# Patient Record
Sex: FEMALE | Race: WHITE | NOT HISPANIC OR LATINO | Employment: UNEMPLOYED | URBAN - METROPOLITAN AREA
[De-identification: names, ages, dates, MRNs, and addresses within clinical notes are randomized per-mention and may not be internally consistent; named-entity substitution may affect disease eponyms.]

---

## 2024-04-04 ENCOUNTER — HOSPITAL ENCOUNTER (EMERGENCY)
Facility: HOSPITAL | Age: 34
Discharge: HOME | End: 2024-04-04
Attending: STUDENT IN AN ORGANIZED HEALTH CARE EDUCATION/TRAINING PROGRAM
Payer: MEDICAID

## 2024-04-04 VITALS
RESPIRATION RATE: 18 BRPM | DIASTOLIC BLOOD PRESSURE: 72 MMHG | HEART RATE: 82 BPM | BODY MASS INDEX: 26.54 KG/M2 | SYSTOLIC BLOOD PRESSURE: 110 MMHG | HEIGHT: 66 IN | WEIGHT: 165.13 LBS | TEMPERATURE: 98 F | OXYGEN SATURATION: 100 %

## 2024-04-04 DIAGNOSIS — N93.9 VAGINAL BLEEDING: Primary | ICD-10-CM

## 2024-04-04 LAB
ABORH RETYPE: NORMAL
ALBUMIN SERPL-MCNC: 3.7 G/DL (ref 3.5–5)
ALBUMIN/GLOB SERPL: 1 RATIO (ref 1.1–2)
ALP SERPL-CCNC: 72 UNIT/L (ref 40–150)
ALT SERPL-CCNC: 14 UNIT/L (ref 0–55)
APPEARANCE UR: ABNORMAL
AST SERPL-CCNC: 13 UNIT/L (ref 5–34)
B-HCG UR QL: NEGATIVE
BACTERIA #/AREA URNS AUTO: ABNORMAL /HPF
BASOPHILS # BLD AUTO: 0.02 X10(3)/MCL
BASOPHILS NFR BLD AUTO: 0.2 %
BILIRUB SERPL-MCNC: 0.2 MG/DL
BILIRUB UR QL STRIP.AUTO: NEGATIVE
BUN SERPL-MCNC: 8 MG/DL (ref 7–18.7)
CALCIUM SERPL-MCNC: 9.5 MG/DL (ref 8.4–10.2)
CHLORIDE SERPL-SCNC: 104 MMOL/L (ref 98–107)
CO2 SERPL-SCNC: 25 MMOL/L (ref 22–29)
COLOR UR AUTO: ABNORMAL
CREAT SERPL-MCNC: 0.74 MG/DL (ref 0.55–1.02)
CTP QC/QA: YES
EOSINOPHIL # BLD AUTO: 0.09 X10(3)/MCL (ref 0–0.9)
EOSINOPHIL NFR BLD AUTO: 1 %
ERYTHROCYTE [DISTWIDTH] IN BLOOD BY AUTOMATED COUNT: 14.3 % (ref 11.5–17)
GFR SERPLBLD CREATININE-BSD FMLA CKD-EPI: >60 MLS/MIN/1.73/M2
GLOBULIN SER-MCNC: 3.7 GM/DL (ref 2.4–3.5)
GLUCOSE SERPL-MCNC: 348 MG/DL (ref 74–100)
GLUCOSE UR QL STRIP.AUTO: ABNORMAL
GROUP & RH: NORMAL
HCT VFR BLD AUTO: 37.4 % (ref 37–47)
HGB BLD-MCNC: 11.8 G/DL (ref 12–16)
HOLD SPECIMEN: NORMAL
IMM GRANULOCYTES # BLD AUTO: 0.02 X10(3)/MCL (ref 0–0.04)
IMM GRANULOCYTES NFR BLD AUTO: 0.2 %
INDIRECT COOMBS: NORMAL
KETONES UR QL STRIP.AUTO: ABNORMAL
LEUKOCYTE ESTERASE UR QL STRIP.AUTO: 75
LYMPHOCYTES # BLD AUTO: 2.87 X10(3)/MCL (ref 0.6–4.6)
LYMPHOCYTES NFR BLD AUTO: 33.2 %
MAGNESIUM SERPL-MCNC: 1.9 MG/DL (ref 1.6–2.6)
MCH RBC QN AUTO: 26.2 PG (ref 27–31)
MCHC RBC AUTO-ENTMCNC: 31.6 G/DL (ref 33–36)
MCV RBC AUTO: 83.1 FL (ref 80–94)
MONOCYTES # BLD AUTO: 0.61 X10(3)/MCL (ref 0.1–1.3)
MONOCYTES NFR BLD AUTO: 7.1 %
MUCOUS THREADS URNS QL MICRO: ABNORMAL /LPF
NEUTROPHILS # BLD AUTO: 5.03 X10(3)/MCL (ref 2.1–9.2)
NEUTROPHILS NFR BLD AUTO: 58.3 %
NITRITE UR QL STRIP.AUTO: NEGATIVE
NRBC BLD AUTO-RTO: 0 %
PH UR STRIP.AUTO: 5.5 [PH]
PLATELET # BLD AUTO: 350 X10(3)/MCL (ref 130–400)
PMV BLD AUTO: 10.2 FL (ref 7.4–10.4)
POTASSIUM SERPL-SCNC: 3.6 MMOL/L (ref 3.5–5.1)
PROT SERPL-MCNC: 7.4 GM/DL (ref 6.4–8.3)
PROT UR QL STRIP.AUTO: ABNORMAL
RBC # BLD AUTO: 4.5 X10(6)/MCL (ref 4.2–5.4)
RBC #/AREA URNS AUTO: >100 /HPF
RBC UR QL AUTO: ABNORMAL
SODIUM SERPL-SCNC: 139 MMOL/L (ref 136–145)
SP GR UR STRIP.AUTO: 1.05 (ref 1–1.03)
SPECIMEN OUTDATE: NORMAL
SQUAMOUS #/AREA URNS LPF: ABNORMAL /HPF
UROBILINOGEN UR STRIP-ACNC: NORMAL
WBC # SPEC AUTO: 8.64 X10(3)/MCL (ref 4.5–11.5)
WBC #/AREA URNS AUTO: ABNORMAL /HPF

## 2024-04-04 PROCEDURE — 99284 EMERGENCY DEPT VISIT MOD MDM: CPT | Mod: 25

## 2024-04-04 PROCEDURE — 81001 URINALYSIS AUTO W/SCOPE: CPT | Performed by: STUDENT IN AN ORGANIZED HEALTH CARE EDUCATION/TRAINING PROGRAM

## 2024-04-04 PROCEDURE — 86901 BLOOD TYPING SEROLOGIC RH(D): CPT | Performed by: STUDENT IN AN ORGANIZED HEALTH CARE EDUCATION/TRAINING PROGRAM

## 2024-04-04 PROCEDURE — 81025 URINE PREGNANCY TEST: CPT | Performed by: STUDENT IN AN ORGANIZED HEALTH CARE EDUCATION/TRAINING PROGRAM

## 2024-04-04 PROCEDURE — 25000003 PHARM REV CODE 250: Performed by: STUDENT IN AN ORGANIZED HEALTH CARE EDUCATION/TRAINING PROGRAM

## 2024-04-04 PROCEDURE — 83735 ASSAY OF MAGNESIUM: CPT | Performed by: STUDENT IN AN ORGANIZED HEALTH CARE EDUCATION/TRAINING PROGRAM

## 2024-04-04 PROCEDURE — 80053 COMPREHEN METABOLIC PANEL: CPT | Performed by: STUDENT IN AN ORGANIZED HEALTH CARE EDUCATION/TRAINING PROGRAM

## 2024-04-04 PROCEDURE — 85025 COMPLETE CBC W/AUTO DIFF WBC: CPT | Performed by: STUDENT IN AN ORGANIZED HEALTH CARE EDUCATION/TRAINING PROGRAM

## 2024-04-04 PROCEDURE — 96360 HYDRATION IV INFUSION INIT: CPT

## 2024-04-04 RX ORDER — IBUPROFEN 600 MG/1
600 TABLET ORAL 2 TIMES DAILY
Qty: 10 TABLET | Refills: 0 | Status: SHIPPED | OUTPATIENT
Start: 2024-04-04 | End: 2024-04-09

## 2024-04-04 RX ADMIN — SODIUM CHLORIDE 1000 ML: 9 INJECTION, SOLUTION INTRAVENOUS at 09:04

## 2024-04-04 NOTE — Clinical Note
"Sue"Melisa Joaquin was seen and treated in our emergency department on 4/4/2024.  She may return to work on 04/06/2024.       If you have any questions or concerns, please don't hesitate to call.      Dave Glass MD"

## 2024-04-04 NOTE — Clinical Note
"Sue Toscano" Jemal was seen and treated in our emergency department on 4/4/2024.  She may return to work on 04/06/2024.       If you have any questions or concerns, please don't hesitate to call.      KATHI Burns RN    "

## 2024-04-05 NOTE — ED PROVIDER NOTES
Encounter Date: 4/4/2024       History     Chief Complaint   Patient presents with    Vaginal Bleeding     Pt. States she is having heavier than usual menstrual bleeding and is passing clots. States she has fibroids and an IUD. Denies pain     Patient presents to the emergency department due to vaginal bleeding.  She reports she does have a history of heavy menstrual cycle but tonight she was passing more clots normal so came to the ER to be evaluated.  She she was states she was having some pelvic cramping that it was consistent with her normal menstrual cycle.  She denies any lightheadedness dizziness or shortness of breath.    The history is provided by the patient.     Review of patient's allergies indicates:  No Known Allergies  History reviewed. No pertinent past medical history.  History reviewed. No pertinent surgical history.  History reviewed. No pertinent family history.  Social History     Tobacco Use    Smoking status: Never    Smokeless tobacco: Never   Substance Use Topics    Alcohol use: Yes     Comment: rarely    Drug use: Not Currently     Review of Systems   Constitutional:  Negative for chills and fever.   HENT:  Negative for congestion and sore throat.    Respiratory:  Negative for cough and shortness of breath.    Cardiovascular:  Negative for chest pain and palpitations.   Gastrointestinal:  Negative for abdominal pain and nausea.   Genitourinary:  Positive for vaginal bleeding. Negative for dysuria and hematuria.   Musculoskeletal:  Negative for arthralgias and myalgias.   Neurological:  Negative for dizziness and weakness.       Physical Exam     Initial Vitals [04/04/24 2025]   BP Pulse Resp Temp SpO2   (!) 128/90 108 18 97.6 °F (36.4 °C) 98 %      MAP       --         Physical Exam    Nursing note and vitals reviewed.  Constitutional: She appears well-developed and well-nourished.   HENT:   Head: Normocephalic and atraumatic.   Eyes: EOM are normal. Pupils are equal, round, and reactive to  light.   Neck: Neck supple.   Normal range of motion.  Cardiovascular:  Normal rate, regular rhythm and normal heart sounds.           Pulmonary/Chest: Breath sounds normal. No respiratory distress. She has no wheezes. She has no rales.   Abdominal: Abdomen is soft. There is no abdominal tenderness.   Musculoskeletal:         General: No edema. Normal range of motion.      Cervical back: Normal range of motion and neck supple.     Neurological: She is alert and oriented to person, place, and time.   Skin: Skin is warm and dry.         ED Course   Procedures  Labs Reviewed   COMPREHENSIVE METABOLIC PANEL - Abnormal; Notable for the following components:       Result Value    Glucose Level 348 (*)     Globulin 3.7 (*)     Albumin/Globulin Ratio 1.0 (*)     All other components within normal limits   URINALYSIS, REFLEX TO URINE CULTURE - Abnormal; Notable for the following components:    Color, UA Red (*)     Appearance, UA Turbid (*)     Specific Gravity, UA 1.047 (*)     Protein, UA 1+ (*)     Glucose, UA 4+ (*)     Ketones, UA 1+ (*)     Blood, UA 3+ (*)     Leukocyte Esterase, UA 75 (*)     RBC, UA >100 (*)     All other components within normal limits   CBC WITH DIFFERENTIAL - Abnormal; Notable for the following components:    Hgb 11.8 (*)     MCH 26.2 (*)     MCHC 31.6 (*)     All other components within normal limits   CBC W/ AUTO DIFFERENTIAL    Narrative:     The following orders were created for panel order CBC auto differential.  Procedure                               Abnormality         Status                     ---------                               -----------         ------                     CBC with Differential[[id number]]       Abnormal            Final result                 Please view results for these tests on the individual orders.   MAGNESIUM   EXTRA TUBES    Narrative:     The following orders were created for panel order EXTRA TUBES.  Procedure                               Abnormality          Status                     ---------                               -----------         ------                     Light Blue Top Hold[[id number]]                             In process                 Red Top Hold[[id number]]                                    In process                 Pink Top Hold[[id number]]                                   In process                   Please view results for these tests on the individual orders.   LIGHT BLUE TOP HOLD   RED TOP HOLD   PINK TOP HOLD   POCT URINE PREGNANCY   TYPE & SCREEN   ABORH RETYPE          Imaging Results    None          Medications   sodium chloride 0.9% bolus 1,000 mL 1,000 mL (1,000 mLs Intravenous New Bag 4/4/24 1162)     Medical Decision Making  Vital signs are stable.  Hemoglobin stable.  Other lab workup is generally reassuring.  Patient was had no more significant bleeding in the ER and has gone through about 3 pads today.  Will start on Motrin, patient is already following up with her gynecologist for her fibroids.  Return precautions were given.    Amount and/or Complexity of Data Reviewed  Labs: ordered. Decision-making details documented in ED Course.    Risk  Prescription drug management.                                      Clinical Impression:  Final diagnoses:  [N93.9] Vaginal bleeding (Primary)          ED Disposition Condition    Discharge Stable          ED Prescriptions       Medication Sig Dispense Start Date End Date Auth. Provider    ibuprofen (ADVIL,MOTRIN) 600 MG tablet Take 1 tablet (600 mg total) by mouth 2 (two) times a day. for 5 days 10 tablet 4/4/2024 4/9/2024 Dave Glass MD          Follow-up Information       Follow up With Specialties Details Why Contact Info    [facility] - Emergency Dept Emergency Medicine Go to  If symptoms worsen [address] [facility] [place] [address] [phone]             Dave Glass MD  04/04/24 7983

## 2024-04-05 NOTE — DISCHARGE INSTRUCTIONS
Follow up with your gynecologist.  Do not hesitate to return to the ER for any new or worsening symptoms especially shortness of breath, lightheadedness, or worsening bleeding.

## 2024-06-12 DIAGNOSIS — Z01.419 ENCOUNTER FOR WELL WOMAN EXAM WITH ROUTINE GYNECOLOGICAL EXAM: Primary | ICD-10-CM

## 2024-07-01 ENCOUNTER — OFFICE VISIT (OUTPATIENT)
Dept: GYNECOLOGY | Facility: CLINIC | Age: 34
End: 2024-07-01
Payer: MEDICAID

## 2024-07-01 VITALS
OXYGEN SATURATION: 100 % | HEART RATE: 94 BPM | BODY MASS INDEX: 25.39 KG/M2 | HEIGHT: 66 IN | WEIGHT: 158 LBS | DIASTOLIC BLOOD PRESSURE: 72 MMHG | TEMPERATURE: 98 F | RESPIRATION RATE: 18 BRPM | SYSTOLIC BLOOD PRESSURE: 138 MMHG

## 2024-07-01 DIAGNOSIS — Z01.419 WELL WOMAN EXAM WITH ROUTINE GYNECOLOGICAL EXAM: Primary | ICD-10-CM

## 2024-07-01 PROCEDURE — 99214 OFFICE O/P EST MOD 30 MIN: CPT | Mod: PBBFAC

## 2024-07-01 RX ORDER — BLOOD-GLUCOSE SENSOR
EACH MISCELLANEOUS
COMMUNITY

## 2024-07-01 RX ORDER — INSULIN GLARGINE 100 [IU]/ML
87 INJECTION, SOLUTION SUBCUTANEOUS
COMMUNITY
Start: 2024-04-25

## 2024-07-01 RX ORDER — ESCITALOPRAM OXALATE 10 MG/1
5 TABLET ORAL
COMMUNITY
Start: 2024-05-23

## 2024-07-01 RX ORDER — FAMOTIDINE 20 MG/1
20 TABLET, FILM COATED ORAL
COMMUNITY
Start: 2024-03-19 | End: 2025-03-19

## 2024-07-01 RX ORDER — BLOOD-GLUCOSE,RECEIVER,CONT
EACH MISCELLANEOUS
COMMUNITY
Start: 2024-03-28

## 2024-07-01 RX ORDER — BLOOD-GLUCOSE TRANSMITTER
EACH MISCELLANEOUS
COMMUNITY
Start: 2024-06-03

## 2024-07-01 RX ORDER — CALCIUM CITRATE/VITAMIN D3 200MG-6.25
TABLET ORAL
COMMUNITY

## 2024-07-01 NOTE — PROGRESS NOTES
[facility] -  Gynecology / Women's Health Clinic     Subjective:      Patient ID: Sue Joaquin is a 34 y.o. female.    Chief Complaint:  Gynecologic Exam      History of Present Illness:  The patient  here for annual exam. Last GYN exam  per patient. Her LMP was 24. Period last 5-7 days and changes pads 8x/day on heaviest 3 days. Pt reported cycles manageable and regular. 2024 H/H 11.8/37.4. Pt reported Dr. Reid inserted an IUD , pt reported Mirena. Pt reported known fibroids, declines further treatment or inquire about fibroids, declines hysterectomy, desires pregnancy. Denies history of abnormal paps. Last pap 5/10/23 -NIL and HPV neg. Denies breast or urinary complaints. Denies pelvic pain, abnormal bleeding or discharge. Pt reports no STIs in the past and no concerns. Pt is sexually active. Contraception consist of Mirena. Denies tobacco use. Denies fly hx of breast, ovarian, uterine or colon cancer.     GYN & OB History:  Patient's last menstrual period was 2024 (exact date).     OB History    Para Term  AB Living   1 1 1         SAB IAB Ectopic Multiple Live Births                  # Outcome Date GA Lbr Angelito/2nd Weight Sex Type Anes PTL Lv   1 Term      CS-LTranv          History reviewed. No pertinent past medical history.     Past Surgical History:   Procedure Laterality Date     SECTION          Social History     Tobacco Use    Smoking status: Never    Smokeless tobacco: Never   Substance and Sexual Activity    Alcohol use: Yes     Comment: rarely    Drug use: Not Currently    Sexual activity: Yes        Current Outpatient Medications   Medication Instructions    DEXCOM G6  Misc     DEXCOM G6 SENSOR Lexy SMARTSI Each Topical Every 10 Days    DEXCOM G6 TRANSMITTER Lexy For Type 2 diabetes with hyperglycemia (E11.65) and long term (current) use of insulin (Z79.4)    EScitalopram oxalate (LEXAPRO) 5 mg, Oral    famotidine  "(PEPCID) 20 mg, Oral    LANTUS SOLOSTAR U-100 INSULIN 87 Units, Subcutaneous    TRUE METRIX GLUCOSE TEST STRIP Strp SMARTSIG:Via Meter       Review of patient's allergies indicates:  No Known Allergies      Review of Systems:  Review of Systems  Negative except for pertinent findings for positives per HPI.     Objective:     Physical Exam   Visit Vitals  /72   Pulse 94   Temp 98.4 °F (36.9 °C) (Oral)   Resp 18   Ht 5' 6" (1.676 m)   Wt 71.7 kg (158 lb)   LMP 06/04/2024 (Exact Date)   SpO2 100%   BMI 25.50 kg/m²       GENERAL: Well-developed female. No acute distress.    SKIN: Normal to inspection, warm and intact.  BREASTS: No rashes or erythema. No masses, lumps, discharge, tenderness.  VULVA: General appearance normal; external genitalia with no lesions or erythema.  VAGINA: Mucosa/vaginal vault pink, no abnormal discharge or lesions.  CERVIX: Pink, parous appearing os, nela strings visible, no erythema or abnormal discharge.  BIMANUAL EXAM: reveals a 10 week-sized uterus. The uterus is non tender. Bilateral adnexa reveal no tenderness.  PSYCHIATRIC: Patient is oriented to person, place, and time. Mood and affect are normal.    Assessment:       ICD-10-CM ICD-9-CM   1. Well woman exam with routine gynecological exam  Z01.419 V72.31       Plan:     1. Well woman exam with routine gynecological exam  -     Ambulatory referral/consult to Gynecology    Pelvic exam today, pap UTD per ACOG recommendations  Use unscented soap and no scented products. More showers than baths. No douching.    Call with any GYN concerns  Follow up in about 1 year (around 7/1/2025) for Annual.    "

## 2024-08-14 ENCOUNTER — NURSE TRIAGE (OUTPATIENT)
Dept: ADMINISTRATIVE | Facility: CLINIC | Age: 34
End: 2024-08-14
Payer: MEDICAID

## 2024-08-14 NOTE — TELEPHONE ENCOUNTER
Caller states that she is has been nauseated, fatigue and not feeling well x 3 days.pt states that she has diarrhea x 10 and mild abd pain x 1 day. Pt states her  at this time.  Pt advised ED per protocol and verbalized understanding. Pt states she will go see her provider in the morning.   Reason for Disposition   Patient sounds very sick or weak to the triager    Additional Information   Negative: Shock suspected (e.g., cold/pale/clammy skin, too weak to stand, low BP, rapid pulse)   Negative: Difficult to awaken or acting confused (e.g., disoriented, slurred speech)   Negative: Sounds like a life-threatening emergency to the triager   Negative: [1] SEVERE abdominal pain (e.g., excruciating) AND [2] present > 1 hour   Negative: [1] SEVERE abdominal pain AND [2] age > 60 years   Negative: [1] Blood in the stool AND [2] moderate or large amount of blood   Negative: Black or tarry bowel movements  (Exception: Chronic-unchanged black-grey BMs AND is taking iron pills or Pepto-Bismol.)   Negative: [1] Drinking very little AND [2] dehydration suspected (e.g., no urine > 12 hours, very dry mouth, very lightheaded)    Protocols used: Diarrhea-A-AH

## 2024-10-12 ENCOUNTER — HOSPITAL ENCOUNTER (EMERGENCY)
Facility: HOSPITAL | Age: 34
Discharge: HOME | End: 2024-10-13
Attending: EMERGENCY MEDICINE
Payer: MEDICAID

## 2024-10-12 DIAGNOSIS — S00.03XA CONTUSION OF SCALP, INITIAL ENCOUNTER: ICD-10-CM
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
## 2024-10-12 DIAGNOSIS — S39.012A STRAIN OF LUMBAR REGION, INITIAL ENCOUNTER: ICD-10-CM

## 2024-10-12 DIAGNOSIS — S80.02XA CONTUSION OF LEFT KNEE, INITIAL ENCOUNTER: ICD-10-CM

## 2024-10-12 PROCEDURE — 99284 EMERGENCY DEPT VISIT MOD MDM: CPT

## 2024-10-12 NOTE — Clinical Note
"Sue De Leonie" Jemal was seen and treated in our emergency department on 10/12/2024.  She may return to work on 10/15/2024.  No heavy lifting until cleared by occupational medicine or PCP     If you have any questions or concerns, please don't hesitate to call.      Troy Beltran MD"

## 2024-10-13 VITALS
DIASTOLIC BLOOD PRESSURE: 92 MMHG | SYSTOLIC BLOOD PRESSURE: 140 MMHG | RESPIRATION RATE: 17 BRPM | HEIGHT: 67 IN | BODY MASS INDEX: 25.27 KG/M2 | OXYGEN SATURATION: 100 % | TEMPERATURE: 98 F | WEIGHT: 161 LBS | HEART RATE: 98 BPM

## 2024-10-13 LAB
B-HCG UR QL: NEGATIVE
CTP QC/QA: YES

## 2024-10-13 PROCEDURE — 81025 URINE PREGNANCY TEST: CPT | Performed by: EMERGENCY MEDICINE

## 2024-10-13 PROCEDURE — 25000003 PHARM REV CODE 250: Performed by: EMERGENCY MEDICINE

## 2024-10-13 RX ORDER — METHOCARBAMOL 500 MG/1
1000 TABLET, FILM COATED ORAL 3 TIMES DAILY
Qty: 30 TABLET | Refills: 0 | Status: SHIPPED | OUTPATIENT
Start: 2024-10-13 | End: 2024-10-18

## 2024-10-13 RX ORDER — ACETAMINOPHEN 500 MG
1000 TABLET ORAL
Status: COMPLETED | OUTPATIENT
Start: 2024-10-13 | End: 2024-10-13

## 2024-10-13 RX ORDER — MELOXICAM 15 MG/1
15 TABLET ORAL DAILY
Qty: 20 TABLET | Refills: 0 | Status: SHIPPED | OUTPATIENT
Start: 2024-10-13 | End: 2024-11-02

## 2024-10-13 RX ADMIN — ACETAMINOPHEN 1000 MG: 500 TABLET ORAL at 01:10

## 2024-10-13 NOTE — ED PROVIDER NOTES
Encounter Date: 10/12/2024    SCRIBE #1 NOTE: I, Faviola Mesa, am scribing for, and in the presence of,  Troy Beltran MD. I have scribed the following portions of the note - Other sections scribed: HPI, ROS, PE.       History     Chief Complaint   Patient presents with    Fall     Arrives via [employer] c/o fall at work slipped and fell at work and hit her knee and head. Pt did not lose consciousness has some tenderness to c spine.      Patient is a 34-year-old female presenting to the ED following a fall. The pt states that she was at work at [employer] when she slipped on a wet floor, causing her to strike her head on a counter top. Negative LOC. The pt is reporting left knee pain and lower back pain. She also notes chronic left lower extremity edema that is being worked up by her PCP.    The history is provided by the patient. No  was used.     Review of patient's allergies indicates:  No Known Allergies  History reviewed. No pertinent past medical history.  Past Surgical History:   Procedure Laterality Date     SECTION       No family history on file.  Social History     Tobacco Use    Smoking status: Never    Smokeless tobacco: Never   Substance Use Topics    Alcohol use: Yes     Comment: rarely    Drug use: Not Currently     Review of Systems   Musculoskeletal:  Positive for arthralgias (left knee pain) and back pain (lower).       Physical Exam     Initial Vitals [10/12/24 2125]   BP Pulse Resp Temp SpO2   (!) 155/90 95 18 97.7 °F (36.5 °C) 98 %      MAP       --         Physical Exam    Constitutional: She appears well-developed and well-nourished. No distress.   The pt is ambulatory.   HENT:   Head: Normocephalic and atraumatic.   Right Ear: No hemotympanum.   Left Ear: No hemotympanum.   Eyes: EOM are normal. Right eye exhibits no discharge. Left eye exhibits no discharge. No scleral icterus.   Neck: Neck supple. No tracheal deviation present.   Cardiovascular:  Normal  rate, regular rhythm and intact distal pulses.           No murmur heard.  Pulmonary/Chest: Breath sounds normal. No stridor. No respiratory distress. She has no wheezes. She has no rhonchi.   Abdominal: She exhibits no distension. There is no abdominal tenderness. There is no rebound and no guarding.   Musculoskeletal:         General: No tenderness or edema. Normal range of motion.      Cervical back: Neck supple.      Comments: Abrasion to the left knee. Full ROM without pain. There is no ligamentous laxity.     Neurological: She is alert and oriented to person, place, and time. She has normal strength.   Skin: Skin is dry. No rash noted. No erythema. No pallor.   Psychiatric: She has a normal mood and affect. Her behavior is normal. Judgment and thought content normal.         ED Course   Procedures  Labs Reviewed   POCT URINE PREGNANCY       Result Value    POC Preg Test, Ur Negative       Acceptable Yes            Imaging Results    None          Medications   acetaminophen tablet 1,000 mg (1,000 mg Oral Given 10/13/24 0123)     Medical Decision Making  Differential diagnosis include but are not limited to: concussion, left knee sprain, and lower back strain.    Amount and/or Complexity of Data Reviewed  Labs: ordered. Decision-making details documented in ED Course.  Radiology: ordered and independent interpretation performed. Decision-making details documented in ED Course.            Scribe Attestation:   Scribe #1: I performed the above scribed service and the documentation accurately describes the services I performed. I attest to the accuracy of the note.    Attending Attestation:           Physician Attestation for Scribe:  Physician Attestation Statement for Scribe #1: I, Troy Beltran MD, reviewed documentation, as scribed by Fvaiola Mesa in my presence, and it is both accurate and complete.                                    Clinical Impression:  Final diagnoses:  [W19.XXXA]  Fall, initial encounter (Primary)  [S00.03XA] Contusion of scalp, initial encounter  [S80.02XA] Contusion of left knee, initial encounter  [S39.012A] Strain of lumbar region, initial encounter          ED Disposition Condition    Discharge Stable          ED Prescriptions       Medication Sig Dispense Start Date End Date Auth. Provider    methocarbamoL (ROBAXIN) 500 MG Tab Take 2 tablets (1,000 mg total) by mouth 3 (three) times daily. for 5 days 30 tablet 10/13/2024 10/18/2024 Troy Beltran MD    meloxicam (MOBIC) 15 MG tablet Take 1 tablet (15 mg total) by mouth once daily. for 20 days 20 tablet 10/13/2024 11/2/2024 Troy Beltran MD          Follow-up Information       Follow up With Specialties Details Why Contact Info    PCP  Call in 2 days  follow up with PCP ni 1-2 days             Troy Beltran MD  10/13/24 0860

## 2025-07-02 ENCOUNTER — RESULTS FOLLOW-UP (OUTPATIENT)
Dept: GYNECOLOGY | Facility: CLINIC | Age: 35
End: 2025-07-02

## 2025-07-02 ENCOUNTER — TELEPHONE (OUTPATIENT)
Dept: GYNECOLOGY | Facility: CLINIC | Age: 35
End: 2025-07-02

## 2025-07-02 ENCOUNTER — OFFICE VISIT (OUTPATIENT)
Dept: GYNECOLOGY | Facility: CLINIC | Age: 35
End: 2025-07-02
Payer: MEDICAID

## 2025-07-02 VITALS
BODY MASS INDEX: 25.3 KG/M2 | OXYGEN SATURATION: 100 % | HEIGHT: 67 IN | HEART RATE: 76 BPM | TEMPERATURE: 98 F | DIASTOLIC BLOOD PRESSURE: 78 MMHG | WEIGHT: 161.19 LBS | SYSTOLIC BLOOD PRESSURE: 118 MMHG | RESPIRATION RATE: 18 BRPM

## 2025-07-02 DIAGNOSIS — Z01.419 WELL WOMAN EXAM WITH ROUTINE GYNECOLOGICAL EXAM: Primary | ICD-10-CM

## 2025-07-02 DIAGNOSIS — B96.89 BACTERIAL VAGINOSIS: Primary | ICD-10-CM

## 2025-07-02 DIAGNOSIS — Z30.431 SURVEILLANCE FOR BIRTH CONTROL, INTRAUTERINE DEVICE: ICD-10-CM

## 2025-07-02 DIAGNOSIS — N76.0 BACTERIAL VAGINOSIS: Primary | ICD-10-CM

## 2025-07-02 DIAGNOSIS — N89.8 VAGINAL ODOR: ICD-10-CM

## 2025-07-02 LAB
B-HCG UR QL: NEGATIVE
CLUE CELLS VAG QL WET PREP: ABNORMAL
CTP QC/QA: YES
T VAGINALIS VAG QL WET PREP: ABNORMAL
WBC #/AREA VAG WET PREP: ABNORMAL
YEAST SPEC QL WET PREP: ABNORMAL

## 2025-07-02 PROCEDURE — 87210 SMEAR WET MOUNT SALINE/INK: CPT

## 2025-07-02 PROCEDURE — 81025 URINE PREGNANCY TEST: CPT | Mod: PBBFAC

## 2025-07-02 PROCEDURE — 99214 OFFICE O/P EST MOD 30 MIN: CPT | Mod: PBBFAC

## 2025-07-02 RX ORDER — BLOOD-GLUCOSE,RECEIVER,CONT
EACH MISCELLANEOUS
COMMUNITY
Start: 2025-05-12

## 2025-07-02 RX ORDER — INSULIN GLARGINE 300 [IU]/ML
120 INJECTION, SOLUTION SUBCUTANEOUS
COMMUNITY
Start: 2025-06-26

## 2025-07-02 RX ORDER — METRONIDAZOLE 500 MG/1
500 TABLET ORAL 2 TIMES DAILY
Qty: 14 TABLET | Refills: 0 | Status: SHIPPED | OUTPATIENT
Start: 2025-07-02 | End: 2025-07-09

## 2025-07-02 RX ORDER — TRIAMCINOLONE ACETONIDE 0.25 MG/G
OINTMENT TOPICAL 2 TIMES DAILY
COMMUNITY
Start: 2025-06-18

## 2025-07-02 NOTE — TELEPHONE ENCOUNTER
----- Message from HEBER Sims sent at 7/2/2025 12:44 PM CDT -----  Swab showed clue cells indicating bacterial vaginosis. Not an STD but an infection in the vaginal area when pH is disrupted. Rx sent for Flagyl. No alcohol during and for 48-72 hours after treatment.   Use unscented soap and no scented products. More showers than baths. No douching.    ----- Message -----  From: Blanka Peters LPN  Sent: 7/2/2025   9:00 AM CDT  To: HEBER Sims

## 2025-07-02 NOTE — TELEPHONE ENCOUNTER
----- Message from Nia sent at 7/2/2025  9:34 AM CDT -----  Patient requesting a call back from provider.

## 2025-07-02 NOTE — TELEPHONE ENCOUNTER
confirmed. Returned pt call. Pt stated her Trans Pelv. US is scheduled  at 9am. Thanked pt for the update and informed her I would let provider know. She verbalized understanding.

## 2025-07-02 NOTE — PROGRESS NOTES
[facility] -  Gynecology / Women's Health Clinic     Subjective:      Patient ID: Sue Joaquin is a 35 y.o. female.    Chief Complaint:  Well Woman      History of Present Illness:  The patient  here for annual exam. Her LMP was 25. Period last 4-7 days and changes pads 8x/day on heaviest 3 days, monthly cycles. Pt reported Dr. Reid inserted an IUD , pt reported Mirena. Pt reported known fibroids, declines further treatment or inquire about fibroids, declines hysterectomy, desires pregnancy. Denies history of abnormal paps. Last pap 5/10/23 -NIL and HPV neg. Denies breast or urinary complaints. Denies pelvic pain or abnormal discharge. Admits vaginal odor occ, uses scented soaps for showers and started douching occ. Pt reports no STIs in the past and no concerns today. Pt is sexually active. Contraception consist of Mirena. Denies tobacco use. Denies fly hx of breast, ovarian, uterine or colon cancer.     GYN & OB History:  Patient's last menstrual period was 2025 (exact date).     OB History    Para Term  AB Living   2 0 0  1    SAB IAB Ectopic Multiple Live Births   1          # Outcome Date GA Lbr Angelito/2nd Weight Sex Type Anes PTL Lv   2       CS-Unspec      1 SAB                History reviewed. No pertinent past medical history.     Past Surgical History:   Procedure Laterality Date     SECTION          Social History     Tobacco Use    Smoking status: Never     Passive exposure: Never    Smokeless tobacco: Never   Substance and Sexual Activity    Alcohol use: Yes     Comment: rarely    Drug use: Not Currently    Sexual activity: Not Currently     Birth control/protection: I.U.D.     Comment: Mirena        Current Outpatient Medications   Medication Instructions    blood-glucose,,cont (DEXCOM G7 ) Misc See Admin Instructions.    DEXCOM G6 SENSOR Lexy SMARTSI Each Topical Every 10 Days    DEXCOM G6 TRANSMITTER Lexy  "For Type 2 diabetes with hyperglycemia (E11.65) and long term (current) use of insulin (Z79.4)    EScitalopram oxalate (LEXAPRO) 5 mg    famotidine (PEPCID) 20 mg, Oral    insulin glargine (TOUJEO) (TOUJEO) 120 Units    triamcinolone acetonide 0.025% (KENALOG) 0.025 % Oint 2 times daily    TRUE METRIX GLUCOSE TEST STRIP Strp SMARTSIG:Via Meter       Review of patient's allergies indicates:  No Known Allergies      Review of Systems:  Review of Systems  Negative except for pertinent findings for positives per HPI.     Objective:     Physical Exam   Visit Vitals  /78 (BP Location: Right arm, Patient Position: Sitting)   Pulse 76   Temp 98.1 °F (36.7 °C) (Oral)   Resp 18   Ht 5' 7" (1.702 m)   Wt 73.1 kg (161 lb 3.2 oz)   LMP 06/06/2025 (Exact Date)   SpO2 100%   BMI 25.25 kg/m²       GENERAL: Well-developed female. No acute distress.    SKIN: Normal to inspection, warm and intact.  BREASTS: No rashes or erythema. No masses, lumps, discharge, tenderness.  VULVA: General appearance normal; external genitalia with no lesions or erythema.  VAGINA: Mucosa/vaginal vault pink, no abnormal discharge or lesions.  CERVIX: Pink, parous appearing os, unable to visualize IUD strings, no erythema or abnormal discharge.  BIMANUAL EXAM: reveals a 10 week-sized uterus. The uterus is non tender. Bilateral adnexa reveal no tenderness.  PSYCHIATRIC: Patient is oriented to person, place, and time. Mood and affect are normal.    Assessment:       ICD-10-CM ICD-9-CM   1. Well woman exam with routine gynecological exam  Z01.419 V72.31   2. Vaginal odor  N89.8 625.8   3. Surveillance for birth control, intrauterine device  Z30.431 V25.42       Plan:     1. Well woman exam with routine gynecological exam    2. Vaginal odor  -     Wet Prep, Genital  -     POCT urine pregnancy    3. Surveillance for birth control, intrauterine device  -     US Pelvis Comp with Transvag NON-OB (xpd; Future; Expected date: 07/02/2025    Pelvic exam today, " pap UTD per ACOG recommendations  Wet prep  UPT - neg    Use unscented soap and no scented products. More showers than baths. No douching. Avoid prolonged wet clothing; avoid tight fitting clothing. Wear cotton underwear. Wipe front to back. Recommend probiotics.    IUD strings not visible, TVUS ordered    Call with any GYN concerns  Follow up in about 1 year (around 7/2/2026) for Annual.

## 2025-07-22 ENCOUNTER — HOSPITAL ENCOUNTER (OUTPATIENT)
Dept: RADIOLOGY | Facility: HOSPITAL | Age: 35
Discharge: HOME | End: 2025-07-22
Payer: MEDICAID

## 2025-07-22 DIAGNOSIS — Z30.431 SURVEILLANCE FOR BIRTH CONTROL, INTRAUTERINE DEVICE: ICD-10-CM

## 2025-07-22 PROCEDURE — 76830 TRANSVAGINAL US NON-OB: CPT | Mod: TC

## 2025-07-25 ENCOUNTER — TELEPHONE (OUTPATIENT)
Dept: GYNECOLOGY | Facility: CLINIC | Age: 35
End: 2025-07-25
Payer: MEDICAID

## 2025-07-25 DIAGNOSIS — Z30.431 SURVEILLANCE FOR BIRTH CONTROL, INTRAUTERINE DEVICE: Primary | ICD-10-CM

## 2025-07-25 NOTE — TELEPHONE ENCOUNTER
Attempted to reach patient, no answer.   Pelvic uls reviewed, KUB will be ordered for confirmation of IUD placement, unable to see on TVUS.     Also, front staff, please schedule appt with GYN docs for fibroid/AUB.

## 2025-07-29 ENCOUNTER — TELEPHONE (OUTPATIENT)
Dept: GYNECOLOGY | Facility: CLINIC | Age: 35
End: 2025-07-29
Payer: MEDICAID

## 2025-07-29 NOTE — TELEPHONE ENCOUNTER
confirmed. Informed pt that provider called to inform her that a KUB was ordered for confirmation of IUD placement. Gave pt number to scheduling dept. Informed pt that front staff will call her to schedule apt with GYN doctors for fibroid/AUB. She verbalized understanding.

## 2025-07-29 NOTE — TELEPHONE ENCOUNTER
----- Message from Nia sent at 7/29/2025 11:13 AM CDT -----  Patient states retuning a call back from provider.